# Patient Record
Sex: MALE | Race: BLACK OR AFRICAN AMERICAN
[De-identification: names, ages, dates, MRNs, and addresses within clinical notes are randomized per-mention and may not be internally consistent; named-entity substitution may affect disease eponyms.]

---

## 2022-03-29 ENCOUNTER — APPOINTMENT (OUTPATIENT)
Dept: ORTHOPEDIC SURGERY | Facility: CLINIC | Age: 22
End: 2022-03-29
Payer: MEDICAID

## 2022-03-29 VITALS — WEIGHT: 300 LBS | BODY MASS INDEX: 40.63 KG/M2 | HEIGHT: 72 IN

## 2022-03-29 DIAGNOSIS — Z78.9 OTHER SPECIFIED HEALTH STATUS: ICD-10-CM

## 2022-03-29 DIAGNOSIS — Z80.9 FAMILY HISTORY OF MALIGNANT NEOPLASM, UNSPECIFIED: ICD-10-CM

## 2022-03-29 PROBLEM — Z00.00 ENCOUNTER FOR PREVENTIVE HEALTH EXAMINATION: Status: ACTIVE | Noted: 2022-03-29

## 2022-03-29 PROCEDURE — 72110 X-RAY EXAM L-2 SPINE 4/>VWS: CPT

## 2022-03-29 PROCEDURE — 72050 X-RAY EXAM NECK SPINE 4/5VWS: CPT

## 2022-03-29 PROCEDURE — 99204 OFFICE O/P NEW MOD 45 MIN: CPT

## 2022-03-29 RX ORDER — MELOXICAM 15 MG/1
15 TABLET ORAL
Qty: 30 | Refills: 1 | Status: ACTIVE | COMMUNITY
Start: 2022-03-29 | End: 1900-01-01

## 2022-03-29 NOTE — PHYSICAL EXAM
[de-identified] : General: No acute distress, conversant, well-nourished.\par Head: Normocephalic, atraumatic\par Neck: trachea midline, FROM\par Heart: normotensive and normal rate and rhythm\par Lungs: No labored breathing\par Skin: No abrasions, no rashes, no edema\par Psych: Alert and oriented to person, place and time\par Extremities: no peripheral edema or digital cyanosis\par Gait: Normal gait. Can perform tandem gait.  \par Vascular: warm and well perfused distally, palpable distal pulses\par \par MSK:\par Spine: \par No tenderness to palpation.  No step-off, no deformity.\par \par NEURO:\par Sensation \par          Left           \par C5     2/2               \par C6     2/2               \par C7     2/2               \par C8     2/2              \par T1     2/2             \par \par          Right         \par C5     2/2               \par C6     2/2               \par C7     2/2               \par C8     2/2              \par T1     2/2      \par \par Motor: \par                                                Left             \par C5 (deltoid abduction)             5/5               \par C6 (biceps flexion)                   5/5                \par C7 (triceps extension)             5/5               \par C8 (finger flexion)                     5/5               \par T1 (interosseous)                     5/5           \par \par                                                Right           \par C5 (deltoid abduction)             5/5               \par C6 (biceps flexion)                   5/5                \par C7 (triceps extension)             5/5               \par C8 (finger flexion)                     5/5               \par T1 (interosseous)                     5/5                     \par \par Sensation \par Left L2  -  2/2            \par Left L3  -  2/2\par Left L4  -  2/2\par Left L5  -  2/2\par Left S1  -  2/2\par \par Right L2  -  2/2            \par Right L3  -  2/2\par Right L4  -  2/2\par Right L5  -  2/2\par Right S1  -  2/2\par \par Motor: \par Left L2 (hip flexion)                            5/5                \par Left L3 (knee extension)                   5/5                \par Left L4 (ankle dorsiflexion)                 5/5                \par Left L5 (long toe extensor)                5/5                \par Left S1 (ankle plantar flexion)           5/5\par \par Right L2 (hip flexion)                            5/5                \par Right L3 (knee extension)                   5/5                \par Right L4 (ankle dorsiflexion)                 5/5                \par Right L5 (long toe extensor)                5/5                \par Right S1 (ankle plantar flexion)           5/5\par \par Reflexes: Normal and symmetric\par Negative Spurling’s test.  Negative Kaplan’s reflex.  \par Negative clonus.  Down-going Babinski. [de-identified] : I ordered radiographs to evaluate the patient's symptoms.\par \par Cervical 4 view radiographs taken in the office today show no dislocation or fracture. Straightening of cervical spine.  No instability on dynamic series.\par \par Lumbar 4 view radiographs taken in the office today show no dislocation or fracture.  Thoracolumbar scoliosis.  No instability on dynamic series.

## 2022-03-29 NOTE — ASSESSMENT
[FreeTextEntry1] : 21 year old male presents with acute exacerbation of chronic neck and low back pain. He also has numbness and paresthesias in his upper and lower extremities.  He was in a football accident in 2016. He had +LOC and was taken to [facility] where it took over 24 hours for him to "wake up." Since that time he has had the pain and numbness.  He was treated by a neurologist in the past.  He is otherwise neurologically intact.  He was given a referral for PT. He was given a prescription for meloxicam. He will continue gabapentin and flexeril. He will be sent for cervical, thoracic and lumbar MRIs.  He was given a referral for a neurologist.  He will followup once his MRIs are completed.  We discussed red flag symptoms that would require emergent evaluation. He knows to call with any questions or concerns or if his symptoms acutely worsen.\par

## 2022-03-29 NOTE — HISTORY OF PRESENT ILLNESS
[de-identified] : 21 year old male presents with acute exacerbation of chronic neck and low back pain. He also has numbness and paresthesias in his upper and lower extremities.  He was in a football accident in 2016. He had +LOC and was taken to [facility] where it took over 24 hours for him to "wake up." Since that time he has had the pain and numbness.  He was treated by a neurologist in the past.  He takes gabapentin and Flexeril with some relief.  He denies radicular pain, recent illness, fevers, weakness, balance problems, saddle anesthesia, urinary retention or fecal incontinence.\par

## 2022-04-21 RX ORDER — PREGABALIN 75 MG/1
75 CAPSULE ORAL
Qty: 60 | Refills: 0 | Status: ACTIVE | COMMUNITY
Start: 2022-04-21 | End: 1900-01-01

## 2022-04-26 ENCOUNTER — APPOINTMENT (OUTPATIENT)
Dept: ORTHOPEDIC SURGERY | Facility: CLINIC | Age: 22
End: 2022-04-26
Payer: MEDICAID

## 2022-04-26 PROCEDURE — 99214 OFFICE O/P EST MOD 30 MIN: CPT

## 2022-05-02 NOTE — PHYSICAL EXAM
[de-identified] : General: No acute distress, conversant, well-nourished.\par Head: Normocephalic, atraumatic\par Neck: trachea midline, FROM\par Heart: normotensive and normal rate and rhythm\par Lungs: No labored breathing\par Skin: No abrasions, no rashes, no edema\par Psych: Alert and oriented to person, place and time\par Extremities: no peripheral edema or digital cyanosis\par Gait: Normal gait. Can perform tandem gait.  \par Vascular: warm and well perfused distally, palpable distal pulses\par \par MSK:\par Spine: \par No tenderness to palpation.  No step-off, no deformity.\par \par NEURO:\par Sensation \par          Left           \par C5     2/2               \par C6     2/2               \par C7     2/2               \par C8     2/2              \par T1     2/2             \par \par          Right         \par C5     2/2               \par C6     2/2               \par C7     2/2               \par C8     2/2              \par T1     2/2      \par \par Motor: \par                                                Left             \par C5 (deltoid abduction)             5/5               \par C6 (biceps flexion)                   5/5                \par C7 (triceps extension)             5/5               \par C8 (finger flexion)                     5/5               \par T1 (interosseous)                     5/5           \par \par                                                Right           \par C5 (deltoid abduction)             5/5               \par C6 (biceps flexion)                   5/5                \par C7 (triceps extension)             5/5               \par C8 (finger flexion)                     5/5               \par T1 (interosseous)                     5/5                     \par \par Sensation \par Left L2  -  2/2            \par Left L3  -  2/2\par Left L4  -  2/2\par Left L5  -  2/2\par Left S1  -  2/2\par \par Right L2  -  2/2            \par Right L3  -  2/2\par Right L4  -  2/2\par Right L5  -  2/2\par Right S1  -  2/2\par \par Motor: \par Left L2 (hip flexion)                            5/5                \par Left L3 (knee extension)                   5/5                \par Left L4 (ankle dorsiflexion)                 5/5                \par Left L5 (long toe extensor)                5/5                \par Left S1 (ankle plantar flexion)           5/5\par \par Right L2 (hip flexion)                            5/5                \par Right L3 (knee extension)                   5/5                \par Right L4 (ankle dorsiflexion)                 5/5                \par Right L5 (long toe extensor)                5/5                \par Right S1 (ankle plantar flexion)           5/5\par \par Reflexes: Normal and symmetric\par Negative Spurling’s test.  Negative Kaplan’s reflex.  \par Negative clonus.  Down-going Babinski. [de-identified] : Lumbar MRI (4/18/22): 1. Multilevel degenerative changes superimposed on congenital lumbosacral spinal canal stenosis and epidural lipomatosis.\par 2. Findings contribute to mild to moderate neural foraminal stenosis bilaterally at L5-S1, mild at L3-4 and L4-5.\par 3. Mild to moderate multilevel facet hypertrophy.\par 4. Congenital spinal canal stenosis most notably at L3-4, L4-5 and L5-S1.\par \par Cervical 4 view radiographs (3/29/22) no dislocation or fracture. Straightening of cervical spine.  No instability on dynamic series.\par \par Lumbar 4 view radiographs (3/29/22) no dislocation or fracture.  Thoracolumbar scoliosis.  No instability on dynamic series.

## 2022-05-02 NOTE — HISTORY OF PRESENT ILLNESS
[de-identified] : 22 year old male followup with acute exacerbation of chronic neck and low back pain. He also has numbness and paresthesias in his upper and lower extremities.  He was in a football accident in 2016. He had +LOC and was taken to [facility] where it took over 24 hours for him to "wake up." Since that time he has had the pain and numbness.  He was treated by a neurologist in the past.  He takes meloxicam, gabapentin and Flexeril with some relief.  He denies radicular pain, recent illness, fevers, weakness, balance problems, saddle anesthesia, urinary retention or fecal incontinence.  Since his last appointment his symptoms have remained unchanged.  He is here to review his lumbar MRI.  He has not yet obtained his cervical or thoracic MRIs. He has not started PT.  He is accompanied by his father.  \par

## 2022-05-02 NOTE — ASSESSMENT
[FreeTextEntry1] : 22 year old male followup with acute exacerbation of chronic neck and low back pain. He also has numbness and paresthesias in his upper and lower extremities.  He was in a football accident in 2016. He had +LOC and was taken to [facility] where it took over 24 hours for him to "wake up." Since that time he has had the pain and numbness.  He is otherwise neurologically intact. We reviewed his lumbar MRI.  He will start PT.  He was given information for a new neurologist.  He will continue meloxicam, gabapentin and cyclobenzaprine.  He will followup once his cervical and thoracic MRIs are completed.  We discussed red flag symptoms that would require emergent evaluation. He knows to call with any questions or concerns or if his symptoms acutely worsen.

## 2022-05-24 ENCOUNTER — APPOINTMENT (OUTPATIENT)
Dept: ORTHOPEDIC SURGERY | Facility: CLINIC | Age: 22
End: 2022-05-24

## 2022-05-25 ENCOUNTER — APPOINTMENT (OUTPATIENT)
Dept: ORTHOPEDIC SURGERY | Facility: CLINIC | Age: 22
End: 2022-05-25
Payer: MEDICAID

## 2022-05-25 PROCEDURE — 99214 OFFICE O/P EST MOD 30 MIN: CPT

## 2022-05-25 RX ORDER — PREGABALIN 150 MG/1
150 CAPSULE ORAL TWICE DAILY
Qty: 60 | Refills: 0 | Status: ACTIVE | COMMUNITY
Start: 2022-05-25 | End: 1900-01-01

## 2022-05-26 NOTE — HISTORY OF PRESENT ILLNESS
[de-identified] : 22 year old male followup with acute exacerbation of chronic neck and low back pain. He also has numbness and paresthesias in his upper and lower extremities.  He was in a football accident in 2016. He had +LOC and was taken to [facility] where it took over 24 hours for him to "wake up." Since that time he has had the pain and numbness.  He was treated by a neurologist in the past.  He denies radicular pain, recent illness, fevers, weakness, balance problems, saddle anesthesia, urinary retention or fecal incontinence.  Since his last appointment his symptoms have improved with the Lyrica. He says it helps the "tingling." He denies side effects.  He is accompanied by his father.  \par

## 2022-05-26 NOTE — ASSESSMENT
[FreeTextEntry1] : 22 year old male followup with acute exacerbation of chronic neck and low back pain. He also has numbness and paresthesias in his upper and lower extremities.  He was in a football accident in 2016. He had +LOC and was taken to [facility] where it took over 24 hours for him to "wake up." Since that time he has had the pain and numbness.  He is otherwise neurologically intact. We reviewed his recent MRI.  He was given a new prescription of Lyrica with an increased dose.  He was given a new referral for PT.  He was given a referral for neurology consultation and a list of neurologists.  He will followup after neurology consult.  We discussed red flag symptoms that would require emergent evaluation. He knows to call with any questions or concerns or if his symptoms acutely worsen.

## 2022-05-26 NOTE — PHYSICAL EXAM
[de-identified] : General: No acute distress, conversant, well-nourished.\par Head: Normocephalic, atraumatic\par Neck: trachea midline, FROM\par Heart: normotensive and normal rate and rhythm\par Lungs: No labored breathing\par Skin: No abrasions, no rashes, no edema\par Psych: Alert and oriented to person, place and time\par Extremities: no peripheral edema or digital cyanosis\par Gait: Normal gait. Can perform tandem gait.  \par Vascular: warm and well perfused distally, palpable distal pulses\par \par MSK:\par Spine: \par No tenderness to palpation.  No step-off, no deformity.\par \par NEURO:\par Sensation \par          Left           \par C5     2/2               \par C6     2/2               \par C7     2/2               \par C8     2/2              \par T1     2/2             \par \par          Right         \par C5     2/2               \par C6     2/2               \par C7     2/2               \par C8     2/2              \par T1     2/2      \par \par Motor: \par                                                Left             \par C5 (deltoid abduction)             5/5               \par C6 (biceps flexion)                   5/5                \par C7 (triceps extension)             5/5               \par C8 (finger flexion)                     5/5               \par T1 (interosseous)                     5/5           \par \par                                                Right           \par C5 (deltoid abduction)             5/5               \par C6 (biceps flexion)                   5/5                \par C7 (triceps extension)             5/5               \par C8 (finger flexion)                     5/5               \par T1 (interosseous)                     5/5                     \par \par Sensation \par Left L2  -  2/2            \par Left L3  -  2/2\par Left L4  -  2/2\par Left L5  -  2/2\par Left S1  -  2/2\par \par Right L2  -  2/2            \par Right L3  -  2/2\par Right L4  -  2/2\par Right L5  -  2/2\par Right S1  -  2/2\par \par Motor: \par Left L2 (hip flexion)                            5/5                \par Left L3 (knee extension)                   5/5                \par Left L4 (ankle dorsiflexion)                 5/5                \par Left L5 (long toe extensor)                5/5                \par Left S1 (ankle plantar flexion)           5/5\par \par Right L2 (hip flexion)                            5/5                \par Right L3 (knee extension)                   5/5                \par Right L4 (ankle dorsiflexion)                 5/5                \par Right L5 (long toe extensor)                5/5                \par Right S1 (ankle plantar flexion)           5/5\par \par Reflexes: Normal and symmetric\par Negative Spurling’s test.  Negative Kaplan’s reflex.  \par Negative clonus.  Down-going Babinski. [de-identified] : MRI Thoracic spine (5/9/22): T5-T6, T7-T8, T8-T9 disc herniations without cord compression or cord signal change\par \par Lumbar MRI (4/18/22): 1. Multilevel degenerative changes superimposed on congenital lumbosacral spinal canal stenosis and epidural lipomatosis.\par 2. Findings contribute to mild to moderate neural foraminal stenosis bilaterally at L5-S1, mild at L3-4 and L4-5.\par 3. Mild to moderate multilevel facet hypertrophy.\par 4. Congenital spinal canal stenosis most notably at L3-4, L4-5 and L5-S1.\par \par Cervical 4 view radiographs (3/29/22) no dislocation or fracture. Straightening of cervical spine.  No instability on dynamic series.\par \par Lumbar 4 view radiographs (3/29/22) no dislocation or fracture.  Thoracolumbar scoliosis.  No instability on dynamic series.

## 2022-06-22 ENCOUNTER — APPOINTMENT (OUTPATIENT)
Dept: ORTHOPEDIC SURGERY | Facility: CLINIC | Age: 22
End: 2022-06-22
Payer: MEDICAID

## 2022-06-22 ENCOUNTER — NON-APPOINTMENT (OUTPATIENT)
Age: 22
End: 2022-06-22

## 2022-06-22 PROCEDURE — 99214 OFFICE O/P EST MOD 30 MIN: CPT

## 2022-06-22 RX ORDER — PREGABALIN 200 MG/1
200 CAPSULE ORAL
Qty: 60 | Refills: 0 | Status: ACTIVE | COMMUNITY
Start: 2022-06-22 | End: 1900-01-01

## 2022-06-24 RX ORDER — MELOXICAM 15 MG/1
15 TABLET ORAL
Qty: 30 | Refills: 1 | Status: ACTIVE | COMMUNITY
Start: 2022-06-24 | End: 1900-01-01

## 2022-06-27 NOTE — ASSESSMENT
[FreeTextEntry1] : 22 year old male followup with acute exacerbation of chronic neck and low back pain. He also has numbness and paresthesias in his upper and lower extremities.  He was in a football accident in 2016. He had +LOC and was taken to [facility] where it took over 24 hours for him to "wake up." Since that time he has had the pain and numbness.  He was treated by a neurologist in the past.  He is otherwise neurologically intact.  Since his last appointment he has seen additional relief with the Lyrica.  He has no side effects. He will continue Lyrica. He will continue PT.  He has not yet made an appointment with a neurologist.  He was given a new neurology referral and a list of neurologists.  He will followup after his neurology consultation. We discussed red flag symptoms that would require emergent evaluation. He knows to call with any questions or concerns or if his symptoms acutely worsen.

## 2022-06-27 NOTE — PHYSICAL EXAM
[de-identified] : General: No acute distress, conversant, well-nourished.\par Head: Normocephalic, atraumatic\par Neck: trachea midline, FROM\par Heart: normotensive and normal rate and rhythm\par Lungs: No labored breathing\par Skin: No abrasions, no rashes, no edema\par Psych: Alert and oriented to person, place and time\par Extremities: no peripheral edema or digital cyanosis\par Gait: Normal gait. Can perform tandem gait.  \par Vascular: warm and well perfused distally, palpable distal pulses\par \par MSK:\par Spine: \par No tenderness to palpation.  No step-off, no deformity.\par \par NEURO:\par Sensation \par          Left           \par C5     2/2               \par C6     2/2               \par C7     2/2               \par C8     2/2              \par T1     2/2             \par \par          Right         \par C5     2/2               \par C6     2/2               \par C7     2/2               \par C8     2/2              \par T1     2/2      \par \par Motor: \par                                                Left             \par C5 (deltoid abduction)             5/5               \par C6 (biceps flexion)                   5/5                \par C7 (triceps extension)             5/5               \par C8 (finger flexion)                     5/5               \par T1 (interosseous)                     5/5           \par \par                                                Right           \par C5 (deltoid abduction)             5/5               \par C6 (biceps flexion)                   5/5                \par C7 (triceps extension)             5/5               \par C8 (finger flexion)                     5/5               \par T1 (interosseous)                     5/5                     \par \par Sensation \par Left L2  -  2/2            \par Left L3  -  2/2\par Left L4  -  2/2\par Left L5  -  2/2\par Left S1  -  2/2\par \par Right L2  -  2/2            \par Right L3  -  2/2\par Right L4  -  2/2\par Right L5  -  2/2\par Right S1  -  2/2\par \par Motor: \par Left L2 (hip flexion)                            5/5                \par Left L3 (knee extension)                   5/5                \par Left L4 (ankle dorsiflexion)                 5/5                \par Left L5 (long toe extensor)                5/5                \par Left S1 (ankle plantar flexion)           5/5\par \par Right L2 (hip flexion)                            5/5                \par Right L3 (knee extension)                   5/5                \par Right L4 (ankle dorsiflexion)                 5/5                \par Right L5 (long toe extensor)                5/5                \par Right S1 (ankle plantar flexion)           5/5\par \par Reflexes: Normal and symmetric\par Negative Spurling’s test.  Negative Kaplan’s reflex.  \par Negative clonus.  Down-going Babinski. [de-identified] : MRI Thoracic spine (5/9/22): T5-T6, T7-T8, T8-T9 disc herniations without cord compression or cord signal change\par \par Lumbar MRI (4/18/22): 1. Multilevel degenerative changes superimposed on congenital lumbosacral spinal canal stenosis and epidural lipomatosis.\par 2. Findings contribute to mild to moderate neural foraminal stenosis bilaterally at L5-S1, mild at L3-4 and L4-5.\par 3. Mild to moderate multilevel facet hypertrophy.\par 4. Congenital spinal canal stenosis most notably at L3-4, L4-5 and L5-S1.\par \par Cervical 4 view radiographs (3/29/22) no dislocation or fracture. Straightening of cervical spine.  No instability on dynamic series.\par \par Lumbar 4 view radiographs (3/29/22) no dislocation or fracture.  Thoracolumbar scoliosis.  No instability on dynamic series.

## 2022-06-27 NOTE — HISTORY OF PRESENT ILLNESS
[de-identified] : 22 year old male followup with acute exacerbation of chronic neck and low back pain. He also has numbness and paresthesias in his upper and lower extremities.  He was in a football accident in 2016. He had +LOC and was taken to [facility] where it took over 24 hours for him to "wake up." Since that time he has had the pain and numbness.  He was treated by a neurologist in the past.  He denies radicular pain, recent illness, fevers, weakness, balance problems, saddle anesthesia, urinary retention or fecal incontinence.  Since his last appointment he has seen additional relief with the Lyrica.  He has no side effects. He says even with the new does of the Lyrica he is still bothered by the "tingling." \par

## 2022-06-28 ENCOUNTER — APPOINTMENT (OUTPATIENT)
Dept: ORTHOPEDIC SURGERY | Facility: CLINIC | Age: 22
End: 2022-06-28
Payer: MEDICAID

## 2022-06-28 PROCEDURE — 99214 OFFICE O/P EST MOD 30 MIN: CPT

## 2022-06-28 NOTE — ASSESSMENT
[FreeTextEntry1] : 22 year old male followup with acute exacerbation of chronic neck and low back pain. He also has numbness and paresthesias in his upper and lower extremities.  He was in a football accident in 2016. He had +LOC and was taken to [facility] where it took over 24 hours for him to "wake up." Since that time he has had the pain and numbness.  He was treated by a neurologist in the past.  He denies radicular pain.  He is otherwise neurologically intact.  Since his last appointment he has seen improved relief with the new dosage of Lyrica.   He has no side effects. He will continue Lyrica.  He has a recent NCS/EMG which was unremarkable.  He has an appointment upcoming with Dr. Carias from neurology.  He can continue PT. He will followup after his neurology consultation. We discussed red flag symptoms that would require emergent evaluation. He knows to call with any questions or concerns or if his symptoms acutely worsen.

## 2022-06-28 NOTE — PHYSICAL EXAM
[de-identified] : General: No acute distress, conversant, well-nourished.\par Head: Normocephalic, atraumatic\par Neck: trachea midline, FROM\par Heart: normotensive and normal rate and rhythm\par Lungs: No labored breathing\par Skin: No abrasions, no rashes, no edema\par Psych: Alert and oriented to person, place and time\par Extremities: no peripheral edema or digital cyanosis\par Gait: Normal gait. Can perform tandem gait.  \par Vascular: warm and well perfused distally, palpable distal pulses\par \par MSK:\par Spine: \par No tenderness to palpation.  No step-off, no deformity.\par \par NEURO:\par Sensation \par          Left           \par C5     2/2               \par C6     2/2               \par C7     2/2               \par C8     2/2              \par T1     2/2             \par \par          Right         \par C5     2/2               \par C6     2/2               \par C7     2/2               \par C8     2/2              \par T1     2/2      \par \par Motor: \par                                                Left             \par C5 (deltoid abduction)             5/5               \par C6 (biceps flexion)                   5/5                \par C7 (triceps extension)             5/5               \par C8 (finger flexion)                     5/5               \par T1 (interosseous)                     5/5           \par \par                                                Right           \par C5 (deltoid abduction)             5/5               \par C6 (biceps flexion)                   5/5                \par C7 (triceps extension)             5/5               \par C8 (finger flexion)                     5/5               \par T1 (interosseous)                     5/5                     \par \par Sensation \par Left L2  -  2/2            \par Left L3  -  2/2\par Left L4  -  2/2\par Left L5  -  2/2\par Left S1  -  2/2\par \par Right L2  -  2/2            \par Right L3  -  2/2\par Right L4  -  2/2\par Right L5  -  2/2\par Right S1  -  2/2\par \par Motor: \par Left L2 (hip flexion)                            5/5                \par Left L3 (knee extension)                   5/5                \par Left L4 (ankle dorsiflexion)                 5/5                \par Left L5 (long toe extensor)                5/5                \par Left S1 (ankle plantar flexion)           5/5\par \par Right L2 (hip flexion)                            5/5                \par Right L3 (knee extension)                   5/5                \par Right L4 (ankle dorsiflexion)                 5/5                \par Right L5 (long toe extensor)                5/5                \par Right S1 (ankle plantar flexion)           5/5\par \par Reflexes: Normal and symmetric\par Negative Spurling’s test.  Negative Kaplan’s reflex.  \par Negative clonus.  Down-going Babinski. [de-identified] : MRI Thoracic spine (5/9/22): T5-T6, T7-T8, T8-T9 disc herniations without cord compression or cord signal change\par \par Lumbar MRI (4/18/22): 1. Multilevel degenerative changes superimposed on congenital lumbosacral spinal canal stenosis and epidural lipomatosis.\par 2. Findings contribute to mild to moderate neural foraminal stenosis bilaterally at L5-S1, mild at L3-4 and L4-5.\par 3. Mild to moderate multilevel facet hypertrophy.\par 4. Congenital spinal canal stenosis most notably at L3-4, L4-5 and L5-S1.\par \par Cervical 4 view radiographs (3/29/22) no dislocation or fracture. Straightening of cervical spine.  No instability on dynamic series.\par \par Lumbar 4 view radiographs (3/29/22) no dislocation or fracture.  Thoracolumbar scoliosis.  No instability on dynamic series.

## 2022-06-28 NOTE — HISTORY OF PRESENT ILLNESS
[de-identified] : 22 year old male followup with acute exacerbation of chronic neck and low back pain. He also has numbness and paresthesias in his upper and lower extremities.  He was in a football accident in 2016. He had +LOC and was taken to [facility] where it took over 24 hours for him to "wake up." Since that time he has had the pain and numbness.  He was treated by a neurologist in the past.  He denies radicular pain, recent illness, fevers, weakness, balance problems, saddle anesthesia, urinary retention or fecal incontinence.  Since his last appointment he has seen improved relief with the new dosage of Lyrica.   He has no side effects. He has a recent NCS/EMG for review.  He is accompanied by his aunt.

## 2022-07-06 ENCOUNTER — APPOINTMENT (OUTPATIENT)
Dept: ORTHOPEDIC SURGERY | Facility: CLINIC | Age: 22
End: 2022-07-06

## 2022-07-06 VITALS — HEIGHT: 74 IN | RESPIRATION RATE: 16 BRPM | BODY MASS INDEX: 32.08 KG/M2 | WEIGHT: 250 LBS

## 2022-07-06 DIAGNOSIS — Z78.9 OTHER SPECIFIED HEALTH STATUS: ICD-10-CM

## 2022-07-06 PROCEDURE — 99203 OFFICE O/P NEW LOW 30 MIN: CPT

## 2022-07-06 PROCEDURE — 73110 X-RAY EXAM OF WRIST: CPT | Mod: LT

## 2022-07-06 PROCEDURE — 73090 X-RAY EXAM OF FOREARM: CPT | Mod: LT

## 2022-07-06 RX ORDER — RISPERIDONE 0.5 MG/1
TABLET ORAL
Refills: 0 | Status: ACTIVE | COMMUNITY

## 2022-07-06 RX ORDER — ARIPIPRAZOLE 2 MG/1
TABLET ORAL
Refills: 0 | Status: ACTIVE | COMMUNITY

## 2022-07-06 RX ORDER — CYCLOBENZAPRINE HCL 10 MG
10 TABLET ORAL
Refills: 0 | Status: ACTIVE | COMMUNITY

## 2022-07-06 RX ORDER — HALOPERIDOL LACTATE 2 MG/ML
2 CONCENTRATE, ORAL ORAL
Refills: 0 | Status: ACTIVE | COMMUNITY

## 2022-07-06 RX ORDER — DICLOFENAC SODIUM, ISOPROPYL ALCOHOL 1 %
KIT TOPICAL
Refills: 0 | Status: ACTIVE | COMMUNITY

## 2022-07-28 RX ORDER — PREGABALIN 200 MG/1
200 CAPSULE ORAL 3 TIMES DAILY
Qty: 90 | Refills: 0 | Status: ACTIVE | COMMUNITY
Start: 2022-07-28 | End: 1900-01-01

## 2022-07-28 RX ORDER — MELOXICAM 15 MG/1
15 TABLET ORAL
Qty: 30 | Refills: 1 | Status: ACTIVE | COMMUNITY
Start: 2022-07-28 | End: 1900-01-01

## 2022-08-15 PROBLEM — S69.90XA HAND INJURY: Status: ACTIVE | Noted: 2022-07-06

## 2022-08-17 ENCOUNTER — APPOINTMENT (OUTPATIENT)
Dept: ORTHOPEDIC SURGERY | Facility: CLINIC | Age: 22
End: 2022-08-17

## 2022-08-17 DIAGNOSIS — S69.90XA UNSPECIFIED INJURY OF UNSPECIFIED WRIST, HAND AND FINGER(S), INITIAL ENCOUNTER: ICD-10-CM

## 2022-08-18 RX ORDER — MELOXICAM 15 MG/1
15 TABLET ORAL
Qty: 30 | Refills: 1 | Status: ACTIVE | COMMUNITY
Start: 2022-08-18 | End: 1900-01-01

## 2022-08-18 RX ORDER — PREGABALIN 200 MG/1
200 CAPSULE ORAL 3 TIMES DAILY
Qty: 90 | Refills: 0 | Status: ACTIVE | COMMUNITY
Start: 2022-08-18 | End: 1900-01-01

## 2022-08-24 ENCOUNTER — APPOINTMENT (OUTPATIENT)
Dept: ORTHOPEDIC SURGERY | Facility: CLINIC | Age: 22
End: 2022-08-24

## 2022-08-24 RX ORDER — PREGABALIN 200 MG/1
200 CAPSULE ORAL 3 TIMES DAILY
Qty: 90 | Refills: 0 | Status: ACTIVE | COMMUNITY
Start: 2022-08-24 | End: 1900-01-01

## 2022-08-24 RX ORDER — MELOXICAM 15 MG/1
15 TABLET ORAL
Qty: 30 | Refills: 1 | Status: ACTIVE | COMMUNITY
Start: 2022-08-24 | End: 1900-01-01

## 2022-08-30 ENCOUNTER — APPOINTMENT (OUTPATIENT)
Dept: ORTHOPEDIC SURGERY | Facility: CLINIC | Age: 22
End: 2022-08-30

## 2022-08-30 PROCEDURE — 99214 OFFICE O/P EST MOD 30 MIN: CPT

## 2022-08-31 ENCOUNTER — APPOINTMENT (OUTPATIENT)
Dept: ORTHOPEDIC SURGERY | Facility: CLINIC | Age: 22
End: 2022-08-31

## 2022-09-06 NOTE — PHYSICAL EXAM
[de-identified] : General: No acute distress, conversant, well-nourished.\par Head: Normocephalic, atraumatic\par Neck: trachea midline, FROM\par Heart: normotensive and normal rate and rhythm\par Lungs: No labored breathing\par Skin: No abrasions, no rashes, no edema\par Psych: Alert and oriented to person, place and time\par Extremities: no peripheral edema or digital cyanosis\par Gait: Normal gait. Can perform tandem gait.  \par Vascular: warm and well perfused distally, palpable distal pulses\par \par MSK:\par Spine: \par No tenderness to palpation.  No step-off, no deformity.\par \par NEURO:\par Sensation \par          Left           \par C5     2/2               \par C6     2/2               \par C7     2/2               \par C8     2/2              \par T1     2/2             \par \par          Right         \par C5     2/2               \par C6     2/2               \par C7     2/2               \par C8     2/2              \par T1     2/2      \par \par Motor: \par                                                Left             \par C5 (deltoid abduction)             5/5               \par C6 (biceps flexion)                   5/5                \par C7 (triceps extension)             5/5               \par C8 (finger flexion)                     5/5               \par T1 (interosseous)                     5/5           \par \par                                                Right           \par C5 (deltoid abduction)             5/5               \par C6 (biceps flexion)                   5/5                \par C7 (triceps extension)             5/5               \par C8 (finger flexion)                     5/5               \par T1 (interosseous)                     5/5                     \par \par Sensation \par Left L2  -  2/2            \par Left L3  -  2/2\par Left L4  -  2/2\par Left L5  -  2/2\par Left S1  -  2/2\par \par Right L2  -  2/2            \par Right L3  -  2/2\par Right L4  -  2/2\par Right L5  -  2/2\par Right S1  -  2/2\par \par Motor: \par Left L2 (hip flexion)                            5/5                \par Left L3 (knee extension)                   5/5                \par Left L4 (ankle dorsiflexion)                 5/5                \par Left L5 (long toe extensor)                5/5                \par Left S1 (ankle plantar flexion)           5/5\par \par Right L2 (hip flexion)                            5/5                \par Right L3 (knee extension)                   5/5                \par Right L4 (ankle dorsiflexion)                 5/5                \par Right L5 (long toe extensor)                5/5                \par Right S1 (ankle plantar flexion)           5/5\par \par Reflexes: Normal and symmetric\par Negative Spurling’s test.  Negative Kaplan’s reflex.  \par Negative clonus.  Down-going Babinski. [de-identified] : MRI Thoracic spine (5/9/22): T5-T6, T7-T8, T8-T9 disc herniations without cord compression or cord signal change\par \par Lumbar MRI (4/18/22): 1. Multilevel degenerative changes superimposed on congenital lumbosacral spinal canal stenosis and epidural lipomatosis.\par 2. Findings contribute to mild to moderate neural foraminal stenosis bilaterally at L5-S1, mild at L3-4 and L4-5.\par 3. Mild to moderate multilevel facet hypertrophy.\par 4. Congenital spinal canal stenosis most notably at L3-4, L4-5 and L5-S1.\par \par Cervical 4 view radiographs (3/29/22) no dislocation or fracture. Straightening of cervical spine.  No instability on dynamic series.\par \par Lumbar 4 view radiographs (3/29/22) no dislocation or fracture.  Thoracolumbar scoliosis.  No instability on dynamic series.

## 2022-09-06 NOTE — HISTORY OF PRESENT ILLNESS
[de-identified] : 22 year old male followup with acute exacerbation of chronic neck and low back pain. He also has numbness and paresthesias in his upper and lower extremities.  He was in a football accident in 2016. He had +LOC and was taken to [facility] where it took over 24 hours for him to "wake up." Since that time he has had the pain and numbness.  He was treated by a neurologist in the past.  He denies radicular pain, recent illness, fevers, weakness, balance problems, saddle anesthesia, urinary retention or fecal incontinence.  Since his last visit he continues to have the paresthesias.  He takes the Lyrica with some relief.  He has not yet had his neurology appointment. He is accompanied by his father.
General

## 2022-09-06 NOTE — ASSESSMENT
[FreeTextEntry1] : 22 year old male followup with acute exacerbation of chronic neck and low back pain. He also has numbness and paresthesias in his upper and lower extremities.  He was in a football accident in 2016. He had +LOC and was taken to [facility] where it took over 24 hours for him to "wake up." Since that time he has had the pain and numbness.  He was treated by a neurologist in the past.  He denies radicular pain.  He is otherwise neurologically intact..  Since his last visit he continues to have the paresthesias.  We reviewed his imaging. No indication for spine surgery. He has a referral and appointment to see neurologist Dr. Carias.  He will continue Lyrica. He will continue PT.  He will followup after his appointment with neurology. We discussed red flag symptoms that would require emergent evaluation. He knows to call with any questions or concerns or if his symptoms acutely worsen.

## 2022-09-08 ENCOUNTER — APPOINTMENT (OUTPATIENT)
Age: 22
End: 2022-09-08

## 2022-09-08 ENCOUNTER — NON-APPOINTMENT (OUTPATIENT)
Age: 22
End: 2022-09-08

## 2022-09-08 VITALS
TEMPERATURE: 97.9 F | HEART RATE: 105 BPM | OXYGEN SATURATION: 97 % | WEIGHT: 315 LBS | DIASTOLIC BLOOD PRESSURE: 78 MMHG | BODY MASS INDEX: 42.66 KG/M2 | HEIGHT: 72 IN | SYSTOLIC BLOOD PRESSURE: 120 MMHG

## 2022-09-08 DIAGNOSIS — M54.16 RADICULOPATHY, LUMBAR REGION: ICD-10-CM

## 2022-09-08 PROCEDURE — 99205 OFFICE O/P NEW HI 60 MIN: CPT

## 2022-09-08 NOTE — HISTORY OF PRESENT ILLNESS
[FreeTextEntry1] : BAM ELKINS is a 22 year who presents with pain and paresthesias.\par \par He has tingling, back pain neck pain. \par \par Had a football injury in 2012. Tackled in practice, clipped illegally and knocked out by a helmet strike.  Taken to [facility] and did not regain consciousness for a couple of hours.  When he woke up had pain in neck and back, was confused.  Also had headaches.\par \par Has had multiple [facility] admissions for back pain, neck pain and tingling.  has tingling below knees in both legs and in genitals.  Can't sit for long due to the intense tingling. Tingling used to be better from walking but that doesn't help anymore.\par \par he has been helped by lyrica 200mg TID whihch he has been taking since June\par \par Current symptoms:\par \par Headache: daily headaches, come and go, might take tylenol or aleve which help. +photophobia, phonophobia and vomiting. pain usually bitemporal and throbbing.  \par \par Musculoskeletal/Neck pain: as above\par \par Vestibular / autonomic: some dizziness standing from bending over or getting out of bed\par \par Visual: reading sometimes hurts his eyes within a few minutes\par \par Sleep: sleeps about 5 hours.  trouble initiating and maintaining\par \par Cognitive: trouble remembering things without visual cues\par \par Emotional: dealing with loss of brother.  does have some issues with sadness/anxiety\par \par has probably had some panic attacks\par \par never been on psych meds\par \par has nightmares but not about game.  denies flashbacks. does talk about it a lot. can't be around too many people. also can't be in a room by himself. 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  last was in school in 2019. 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 exercises lifting light weights\par \par \par

## 2022-09-08 NOTE — DATA REVIEWED
[de-identified] : EMG/NCS normal except prolonged b/l H reflexes 3/8/2022\par MRI L spine no neural compression except at L5/S1\par MRI T spine with multiple disc bulges some touching the cor

## 2022-10-21 ENCOUNTER — APPOINTMENT (OUTPATIENT)
Age: 22
End: 2022-10-21

## 2022-10-21 DIAGNOSIS — H81.90 UNSPECIFIED DISORDER OF VESTIBULAR FUNCTION, UNSPECIFIED EAR: ICD-10-CM

## 2022-10-21 DIAGNOSIS — G43.719 CHRONIC MIGRAINE W/OUT AURA, INTRACTABLE, W/OUT STATUS MIGRAINOSUS: ICD-10-CM

## 2022-10-21 PROCEDURE — 99214 OFFICE O/P EST MOD 30 MIN: CPT | Mod: 95

## 2022-10-21 RX ORDER — TOPIRAMATE 50 MG/1
50 TABLET, FILM COATED ORAL TWICE DAILY
Qty: 180 | Refills: 1 | Status: ACTIVE | COMMUNITY
Start: 2022-09-08 | End: 1900-01-01

## 2022-10-21 NOTE — HISTORY OF PRESENT ILLNESS
[FreeTextEntry1] : BAM ELKINS is a 22 year who returns to follow up for remote concussion\par \par last visit was 9/8/2022\par \par since last visit there was some confusion about the PT referral but eventually the [facility] team did reach out.  unfortunately there are transportation issues due to father being at rehab\par \par even though I prescribed topiramate 25mg BID he found 50mg from another doctor that he started. he does report headaches are improved significantly\par \par \par \par

## 2023-01-13 ENCOUNTER — APPOINTMENT (OUTPATIENT)
Age: 23
End: 2023-01-13

## 2023-03-14 ENCOUNTER — APPOINTMENT (OUTPATIENT)
Dept: ORTHOPEDIC SURGERY | Facility: CLINIC | Age: 23
End: 2023-03-14

## 2023-03-28 RX ORDER — PREGABALIN 200 MG/1
200 CAPSULE ORAL
Qty: 60 | Refills: 0 | Status: ACTIVE | COMMUNITY
Start: 2023-03-28 | End: 1900-01-01

## 2023-03-28 RX ORDER — MELOXICAM 15 MG/1
15 TABLET ORAL
Qty: 30 | Refills: 1 | Status: ACTIVE | COMMUNITY
Start: 2023-03-28 | End: 1900-01-01

## 2023-07-24 RX ORDER — PREGABALIN 200 MG/1
200 CAPSULE ORAL
Qty: 60 | Refills: 0 | Status: ACTIVE | COMMUNITY
Start: 2023-07-24 | End: 1900-01-01

## 2023-07-24 RX ORDER — MELOXICAM 15 MG/1
15 TABLET ORAL
Qty: 30 | Refills: 0 | Status: ACTIVE | COMMUNITY
Start: 2023-07-24 | End: 1900-01-01

## 2023-07-27 ENCOUNTER — APPOINTMENT (OUTPATIENT)
Dept: ORTHOPEDIC SURGERY | Facility: CLINIC | Age: 23
End: 2023-07-27

## 2023-08-24 ENCOUNTER — APPOINTMENT (OUTPATIENT)
Dept: ORTHOPEDIC SURGERY | Facility: CLINIC | Age: 23
End: 2023-08-24
Payer: MEDICAID

## 2023-08-24 DIAGNOSIS — S06.0XAA CONCUSSION WITH LOSS OF CONSCIOUSNESS STATUS UNKNOWN, INITIAL ENCOUNTER: ICD-10-CM

## 2023-08-24 DIAGNOSIS — M54.2 CERVICALGIA: ICD-10-CM

## 2023-08-24 PROCEDURE — 99214 OFFICE O/P EST MOD 30 MIN: CPT

## 2023-08-24 RX ORDER — PREGABALIN 200 MG/1
200 CAPSULE ORAL
Qty: 60 | Refills: 0 | Status: ACTIVE | COMMUNITY
Start: 2023-08-24 | End: 1900-01-01

## 2023-08-24 RX ORDER — MELOXICAM 15 MG/1
15 TABLET ORAL
Qty: 30 | Refills: 1 | Status: ACTIVE | COMMUNITY
Start: 2023-08-24 | End: 1900-01-01

## 2023-08-24 NOTE — REASON FOR VISIT
[Follow-Up Visit] : a follow-up visit for [Back Pain] : back pain [Other: ____] : [unfilled] [FreeTextEntry2] : pt has very intense pain , tingling in  gential area

## 2023-08-24 NOTE — PHYSICAL EXAM
[de-identified] : General: No acute distress, conversant, well-nourished.\par  Head: Normocephalic, atraumatic\par  Neck: trachea midline, FROM\par  Heart: normotensive and normal rate and rhythm\par  Lungs: No labored breathing\par  Skin: No abrasions, no rashes, no edema\par  Psych: Alert and oriented to person, place and time\par  Extremities: no peripheral edema or digital cyanosis\par  Gait: Normal gait. Can perform tandem gait.  \par  Vascular: warm and well perfused distally, palpable distal pulses\par  \par  MSK:\par  Spine: \par  No tenderness to palpation.  No step-off, no deformity.\par  \par  NEURO:\par  Sensation \par           Left           \par  C5     2/2               \par  C6     2/2               \par  C7     2/2               \par  C8     2/2              \par  T1     2/2             \par  \par           Right         \par  C5     2/2               \par  C6     2/2               \par  C7     2/2               \par  C8     2/2              \par  T1     2/2      \par  \par  Motor: \par                                                 Left             \par  C5 (deltoid abduction)             5/5               \par  C6 (biceps flexion)                   5/5                \par  C7 (triceps extension)             5/5               \par  C8 (finger flexion)                     5/5               \par  T1 (interosseous)                     5/5           \par  \par                                                 Right           \par  C5 (deltoid abduction)             5/5               \par  C6 (biceps flexion)                   5/5                \par  C7 (triceps extension)             5/5               \par  C8 (finger flexion)                     5/5               \par  T1 (interosseous)                     5/5                     \par  \par  Sensation \par  Left L2  -  2/2            \par  Left L3  -  2/2\par  Left L4  -  2/2\par  Left L5  -  2/2\par  Left S1  -  2/2\par  \par  Right L2  -  2/2            \par  Right L3  -  2/2\par  Right L4  -  2/2\par  Right L5  -  2/2\par  Right S1  -  2/2\par  \par  Motor: \par  Left L2 (hip flexion)                            5/5                \par  Left L3 (knee extension)                   5/5                \par  Left L4 (ankle dorsiflexion)                 5/5                \par  Left L5 (long toe extensor)                5/5                \par  Left S1 (ankle plantar flexion)           5/5\par  \par  Right L2 (hip flexion)                            5/5                \par  Right L3 (knee extension)                   5/5                \par  Right L4 (ankle dorsiflexion)                 5/5                \par  Right L5 (long toe extensor)                5/5                \par  Right S1 (ankle plantar flexion)           5/5\par  \par  Reflexes: Normal and symmetric\par  Negative Spurling's test.  Negative Kaplan's reflex.  \par  Negative clonus.  Down-going Babinski. [de-identified] : MRI C/T/L spine: degenerative changes without compression of neural elements  MRI Thoracic spine (5/9/22): T5-T6, T7-T8, T8-T9 disc herniations without cord compression or cord signal change  Lumbar MRI (4/18/22): 1. Multilevel degenerative changes superimposed on congenital lumbosacral spinal canal stenosis and epidural lipomatosis. 2. Findings contribute to mild to moderate neural foraminal stenosis bilaterally at L5-S1, mild at L3-4 and L4-5. 3. Mild to moderate multilevel facet hypertrophy. 4. Congenital spinal canal stenosis most notably at L3-4, L4-5 and L5-S1.  Cervical 4 view radiographs (3/29/22) no dislocation or fracture. Straightening of cervical spine.  No instability on dynamic series.  Lumbar 4 view radiographs (3/29/22) no dislocation or fracture.  Thoracolumbar scoliosis.  No instability on dynamic series.

## 2023-08-24 NOTE — ASSESSMENT
[FreeTextEntry1] : 23 year old male followup with acute exacerbation of chronic neck and low back pain. He also has numbness and paresthesias in his upper and lower extremities.  He was in a football accident in 2016. He had +LOC and was taken to [facility] where it took over 24 hours for him to "wake up." Since that time he has had the pain and numbness.  He was treated by a neurologist in the past.  He is otherwise at his neurologic baseline.  He continues to have numbness and tingling. We reviewed his recent MRIs. No indication for spine surgery. He can continue PT. The patient was given a referral for physical therapy.  He can continue Lyrica. He will followup with neurologist Dr. Carias.  He will followup with me as needed. We discussed red flag symptoms that would require emergent evaluation. He knows to call with any questions or concerns or if his symptoms acutely worsen.

## 2023-08-24 NOTE — HISTORY OF PRESENT ILLNESS
[de-identified] : 23 year old male followup with acute exacerbation of chronic neck and low back pain. He also has numbness and paresthesias in his upper and lower extremities.  He was in a football accident in 2016. He had +LOC and was taken to [facility] where it took over 24 hours for him to "wake up." Since that time he has had the pain and numbness.  He was treated by a neurologist in the past.  He denies radicular pain, recent illness, fevers, weakness, balance problems, saddle anesthesia, urinary retention or fecal incontinence.  He continues to have numbness and tingling. He has recent MRIs.  He takes the Lyrica with some relief.  He has not seen Dr. Carias since last year. He is accompanied by his father.

## 2023-10-24 RX ORDER — PREGABALIN 200 MG/1
200 CAPSULE ORAL
Qty: 60 | Refills: 2 | Status: ACTIVE | COMMUNITY
Start: 2023-10-24 | End: 1900-01-01

## 2023-11-26 RX ORDER — MELOXICAM 15 MG/1
15 TABLET ORAL
Qty: 30 | Refills: 1 | Status: ACTIVE | COMMUNITY
Start: 2023-11-26 | End: 1900-01-01

## 2023-12-08 ENCOUNTER — APPOINTMENT (OUTPATIENT)
Dept: ORTHOPEDIC SURGERY | Facility: CLINIC | Age: 23
End: 2023-12-08
Payer: MEDICAID

## 2023-12-08 DIAGNOSIS — R20.0 ANESTHESIA OF SKIN: ICD-10-CM

## 2023-12-08 DIAGNOSIS — M54.50 LOW BACK PAIN, UNSPECIFIED: ICD-10-CM

## 2023-12-08 DIAGNOSIS — M41.125 ADOLESCENT IDIOPATHIC SCOLIOSIS, THORACOLUMBAR REGION: ICD-10-CM

## 2023-12-08 PROCEDURE — 99214 OFFICE O/P EST MOD 30 MIN: CPT

## 2023-12-08 RX ORDER — PREGABALIN 200 MG/1
200 CAPSULE ORAL
Qty: 60 | Refills: 2 | Status: ACTIVE | COMMUNITY
Start: 2023-12-08 | End: 1900-01-01

## 2023-12-08 RX ORDER — MELOXICAM 15 MG/1
15 TABLET ORAL
Qty: 30 | Refills: 2 | Status: ACTIVE | COMMUNITY
Start: 2023-12-08 | End: 1900-01-01

## 2023-12-11 PROBLEM — R20.0 BILATERAL HAND NUMBNESS: Status: ACTIVE | Noted: 2022-03-29

## 2023-12-11 PROBLEM — M54.50 LOW BACK PAIN: Status: ACTIVE | Noted: 2022-03-29

## 2023-12-11 PROBLEM — M41.125 ADOLESCENT IDIOPATHIC SCOLIOSIS OF THORACOLUMBAR REGION: Status: ACTIVE | Noted: 2022-03-29

## 2023-12-11 PROBLEM — R20.0 BILATERAL LEG NUMBNESS: Status: ACTIVE | Noted: 2022-03-29

## 2024-01-25 RX ORDER — PREGABALIN 200 MG/1
200 CAPSULE ORAL 3 TIMES DAILY
Qty: 180 | Refills: 0 | Status: ACTIVE | COMMUNITY
Start: 2024-01-25 | End: 1900-01-01

## 2024-01-25 RX ORDER — MELOXICAM 15 MG/1
15 TABLET ORAL
Qty: 30 | Refills: 2 | Status: ACTIVE | COMMUNITY
Start: 2024-01-25 | End: 1900-01-01

## 2024-04-03 RX ORDER — PREGABALIN 200 MG/1
200 CAPSULE ORAL 3 TIMES DAILY
Qty: 90 | Refills: 0 | Status: ACTIVE | COMMUNITY
Start: 2024-04-03 | End: 1900-01-01

## 2024-04-03 RX ORDER — MELOXICAM 15 MG/1
15 TABLET ORAL
Qty: 30 | Refills: 1 | Status: ACTIVE | COMMUNITY
Start: 2024-04-03 | End: 1900-01-01

## 2024-04-03 RX ORDER — CYCLOBENZAPRINE HYDROCHLORIDE 5 MG/1
5 TABLET, FILM COATED ORAL 3 TIMES DAILY
Qty: 42 | Refills: 1 | Status: ACTIVE | COMMUNITY
Start: 2024-04-03 | End: 1900-01-01

## 2024-04-04 ENCOUNTER — NON-APPOINTMENT (OUTPATIENT)
Age: 24
End: 2024-04-04

## 2024-05-20 RX ORDER — MELOXICAM 15 MG/1
15 TABLET ORAL
Qty: 30 | Refills: 1 | Status: ACTIVE | COMMUNITY
Start: 2024-05-20 | End: 1900-01-01

## 2024-05-20 RX ORDER — PREGABALIN 200 MG/1
200 CAPSULE ORAL
Qty: 60 | Refills: 2 | Status: ACTIVE | COMMUNITY
Start: 2024-05-20 | End: 1900-01-01

## 2024-09-27 NOTE — PHYSICAL EXAM
What Is The Reason For Today's Visit?: Full Body Skin Examination
What Is The Reason For Today's Visit? (Being Monitored For X): concerning skin lesions on a periodic basis
[FreeTextEntry1] : General: this is a pleasant patient in no acute distress\par \par HEENT conjunctiva are normal\par \par Mental status:\par Alert and oriented to person, place and time\par Memory: registers 3/3, recalls 2/3 with category cue 3/3\par Attention: serial 7s [id number] (counts on fingers), world backwards ok\par Language is some limited vocabulary, names high frequency objects trouble with low frequency\par \par Head:\par Palpation of cranium and jaw: + left temporal and supraorbital\par Occipital nerve tenderness: ++L and + R\par \par Cervical Spine:\par Palpation:\par Lateral Rotation: mild limited b/l\par Lateral Flexion: mild limited with pain b/l\par Flexion + rotation nl\par \par Eye movements:\par extra-occular movements in tact without nystagmus\par Saccades: discomfort with vertical\par Smooth Pursuit: occasional saccades\par Visually enhanced VOR: dizzy\par Near point of convergence: 12cm\par \par Other cranial nerves: pupils equal, round and reactive to light. Face symmetric and facial strength symmetric, facial sensation symmetric, hearing present bilaterally to finger rub, tongue and uvula midline. neck flexion and extension normal strength.\par \par Motor: normal bulk and tone throughout. no abnormal movements.  Full 5/5 strength uppers and lower extremities proximally and distally\par \par Sensory: in tact and symmetric to vibration, light tough, pin prick, temperature\par \par Cerebellar: normal finger-nose-finger bilaterally\par \par Reflexes: 2+ in the upper and lower extremities and symmetric.  toes are bilaterally downgoing.\par \par Gait: stable, able to tip toe heel and difficulty with tandem\par \par Stances:\par Romberg: stable\par Shapened romberg: stable\par Single leg, eyes closed: unstable eyes open\par

## 2024-10-01 RX ORDER — PREGABALIN 225 MG/1
225 CAPSULE ORAL
Qty: 180 | Refills: 0 | Status: ACTIVE | COMMUNITY
Start: 2024-10-01 | End: 1900-01-01

## 2024-10-01 RX ORDER — MELOXICAM 15 MG/1
15 TABLET ORAL
Qty: 30 | Refills: 1 | Status: ACTIVE | COMMUNITY
Start: 2024-10-01 | End: 1900-01-01

## 2024-10-03 RX ORDER — MELOXICAM 15 MG/1
15 TABLET ORAL
Qty: 30 | Refills: 1 | Status: ACTIVE | COMMUNITY
Start: 2024-10-03 | End: 1900-01-01

## 2024-10-24 ENCOUNTER — APPOINTMENT (OUTPATIENT)
Dept: ORTHOPEDIC SURGERY | Facility: CLINIC | Age: 24
End: 2024-10-24
Payer: MEDICAID

## 2024-10-24 DIAGNOSIS — M54.16 RADICULOPATHY, LUMBAR REGION: ICD-10-CM

## 2024-10-24 DIAGNOSIS — M54.50 LOW BACK PAIN, UNSPECIFIED: ICD-10-CM

## 2024-10-24 PROCEDURE — 99214 OFFICE O/P EST MOD 30 MIN: CPT

## 2024-10-24 RX ORDER — PREGABALIN 200 MG/1
200 CAPSULE ORAL DAILY
Qty: 90 | Refills: 0 | Status: ACTIVE | COMMUNITY
Start: 2024-10-24 | End: 1900-01-01

## 2024-10-24 RX ORDER — MELOXICAM 15 MG/1
15 TABLET ORAL
Qty: 30 | Refills: 1 | Status: ACTIVE | COMMUNITY
Start: 2024-10-24 | End: 1900-01-01

## 2024-10-24 RX ORDER — PREGABALIN 200 MG/1
200 CAPSULE ORAL 3 TIMES DAILY
Qty: 90 | Refills: 0 | Status: ACTIVE | COMMUNITY
Start: 2024-10-24 | End: 1900-01-01

## 2024-12-19 ENCOUNTER — APPOINTMENT (OUTPATIENT)
Dept: ORTHOPEDIC SURGERY | Facility: CLINIC | Age: 24
End: 2024-12-19

## 2025-02-14 RX ORDER — MELOXICAM 15 MG/1
15 TABLET ORAL
Qty: 30 | Refills: 2 | Status: ACTIVE | COMMUNITY
Start: 2025-02-14 | End: 1900-01-01

## 2025-02-14 RX ORDER — PREGABALIN 225 MG/1
225 CAPSULE ORAL
Qty: 180 | Refills: 0 | Status: ACTIVE | COMMUNITY
Start: 2025-02-14 | End: 1900-01-01

## 2025-02-19 ENCOUNTER — APPOINTMENT (OUTPATIENT)
Dept: ORTHOPEDIC SURGERY | Facility: CLINIC | Age: 25
End: 2025-02-19

## 2025-02-26 ENCOUNTER — APPOINTMENT (OUTPATIENT)
Dept: ORTHOPEDIC SURGERY | Facility: CLINIC | Age: 25
End: 2025-02-26

## 2025-02-26 PROCEDURE — 99214 OFFICE O/P EST MOD 30 MIN: CPT

## 2025-02-26 RX ORDER — PREGABALIN 200 MG/1
200 CAPSULE ORAL 3 TIMES DAILY
Qty: 90 | Refills: 0 | Status: ACTIVE | COMMUNITY
Start: 2025-02-26 | End: 1900-01-01

## 2025-05-20 RX ORDER — PREGABALIN 225 MG/1
225 CAPSULE ORAL
Qty: 180 | Refills: 0 | Status: ACTIVE | COMMUNITY
Start: 2025-05-20 | End: 1900-01-01

## 2025-05-21 ENCOUNTER — APPOINTMENT (OUTPATIENT)
Dept: ORTHOPEDIC SURGERY | Facility: CLINIC | Age: 25
End: 2025-05-21
Payer: MEDICAID

## 2025-05-21 DIAGNOSIS — M54.16 RADICULOPATHY, LUMBAR REGION: ICD-10-CM

## 2025-05-21 DIAGNOSIS — M54.50 LOW BACK PAIN, UNSPECIFIED: ICD-10-CM

## 2025-05-21 DIAGNOSIS — R20.0 ANESTHESIA OF SKIN: ICD-10-CM

## 2025-05-21 PROCEDURE — 99214 OFFICE O/P EST MOD 30 MIN: CPT

## 2025-06-06 RX ORDER — PREGABALIN 225 MG/1
225 CAPSULE ORAL TWICE DAILY
Qty: 90 | Refills: 0 | Status: ACTIVE | COMMUNITY
Start: 2025-06-06 | End: 1900-01-01

## 2025-06-06 RX ORDER — MELOXICAM 15 MG/1
15 TABLET ORAL TWICE DAILY
Qty: 60 | Refills: 1 | Status: ACTIVE | COMMUNITY
Start: 2025-06-06 | End: 1900-01-01

## 2025-07-09 RX ORDER — PREGABALIN 200 MG/1
200 CAPSULE ORAL
Qty: 60 | Refills: 2 | Status: ACTIVE | COMMUNITY
Start: 2025-07-09 | End: 1900-01-01

## 2025-08-12 RX ORDER — PREGABALIN 200 MG/1
200 CAPSULE ORAL 3 TIMES DAILY
Qty: 90 | Refills: 0 | Status: ACTIVE | COMMUNITY
Start: 2025-08-12 | End: 1900-01-01